# Patient Record
Sex: FEMALE | Race: WHITE | URBAN - METROPOLITAN AREA
[De-identification: names, ages, dates, MRNs, and addresses within clinical notes are randomized per-mention and may not be internally consistent; named-entity substitution may affect disease eponyms.]

---

## 2022-10-18 ENCOUNTER — HOSPITAL ENCOUNTER (OUTPATIENT)
Dept: PHYSICAL THERAPY | Age: 42
Discharge: HOME | End: 2022-10-18
Payer: COMMERCIAL

## 2022-10-18 PROCEDURE — 97535 SELF CARE MNGMENT TRAINING: CPT

## 2022-10-18 PROCEDURE — 97162 PT EVAL MOD COMPLEX 30 MIN: CPT

## 2022-10-18 NOTE — THERAPY EVALUATION
[address], [address],[place], [address] [facility] - Phone: [phone]  Fax: [phone] [facility] / [address] [facility]  Patient Name: Antoine De León : 1980   Medical   Diagnosis: B Knee Pain; R>L Treatment Diagnosis: Left knee pain [M25.562]  Right knee pain [M25.561]   Onset Date: 1 year (Oct 2021)     Referral Source: Marquez Jackson* Start of Care [facility]): 10/18/2022   Prior Hospitalization: See medical history Provider #: [id number]   Prior Level of Function: Ind and pain free with ADLs and activities of leisure   Comorbidities: ADHD. Medications: Verified on Patient Summary List   The Plan of Care and following information is based on the information from the initial evaluation.   ===========================================================================================  Assessment / key information:  Patient is a 43year old female. Primary complaints of bilateral knee pain; R>L that began one year ago due to wear and tear. Locates pain at front of both knees. Rates pain as 2-9/10 from best to worst. Pain today is rated as 3/10. Describes pain as aching normally and occasional sharpness and constant for the right knee and intermittent of the left in nature. Ibuprofen, ice, braces makes the pain better. Squatting down and returning to upright makes the pain worse. Patient is employed as a  at [employer], which requires bending, lifting, standing 9-10 hours, walking. Patient's hobbies have not been affected by her current diagnosis. . Their primary goals in PT are to reduce pain, improve ROM in order to take part in these activities of leisure. Patient recently saw their referring provider who prescribed OPPT.  They are scheduled to return to their provider in 6 weeks (will schedule soon).  ===========================================================================================  Objective Measures:  Observation: Pleasant and cooperative female. Appears stated age. Posture: Good and upright; amb with very slight antalgia and reduce TKE lexy on R  Palpation: crepitus noted bilaterally R>L with PROM  ROM: WNL bilaterally. Fear of R knee flexion at end range  Strength: 5/5 bilateral LE strength grossly  Flexibility: Hamstrings mildly impaired; piriformis mildly impaired Bilaterally. Skin Integrity: Intact  Swelling/Edema: None appreciated  SI Joint Assessment: Not assessed this visit  Positional Tolerances: Patient able to tolerate all positions  FOTO Score: 49/100 Goal ---> 68/100    HEP Provided:  - Discussed icing 3-4x/day when knee is bothering her for 10-15 minute bouts  - Discussed watching how much ibuprofen she is taking to prevent GI distress  - Discussed natural anti-inflammatory options  - Discussed POC and plan for PT progression and goals  - Educated on A&P of arthritis and education on fear reduction    ===========================================================================================  Eval Complexity: History MEDIUM  Complexity : 1-2 comorbidities / personal factors will impact the outcome/ POC ;  Examination  MEDIUM Complexity : 3 Standardized tests and measures addressing body structure, function, activity limitation and / or participation in recreation ; Presentation MEDIUM Complexity : Evolving with changing characteristics ;   Decision Making MEDIUM Complexity : FOTO score of 26-74; Overall Complexity MEDIUM  Problem List: pain affecting function, decrease ROM, decrease strength, impaired gait/ balance, decrease ADL/ functional abilitiies, decrease activity tolerance, decrease flexibility/ joint mobility, and decrease transfer abilities   Treatment Plan may include any combination of the following: Therapeutic exercise, Neuromuscular reeducation, Manual therapy, Therapeutic activity, Self care/home management, Electric stim unattended , Vasopneumatic device, Aquatic therapy, Gait training, Ultrasound, and Electric stim attended  Patient / Family readiness to learn indicated by: asking questions, trying to perform skills, and interest  Persons(s) to be included in education: patient (P)  Barriers to Learning/Limitations: None  Measures taken, if barriers to learning:    Patient Goal (s): Patient would like to return to being able to stand and walk for more time with improved confidence   Patient self reported health status: fair  Rehabilitation Potential: good  Short Term Goals: To be accomplished in  3  weeks:  Patient will show compliance with regular HEP performance as prescribed by PT. Patient will reduce max pain to 6/10 in order to perform iADLs and ADLs more comfortably. Patient will improve hip and glute endurance to be able to perform 50% bridge x 20 reps. Patient will improve GROC score to 2+ for perception of improvement and QoL. Long Term Goals: To be accomplished in  6  weeks:  Patient will reduce max pain to 3/10 in order to perform walking program.  Patient will improve hip and glute endurance to be able to perform 100% bridge x 20 reps. Patient will improve FOTO score to 68 for improved functional mobility and to return to PLOF. Frequency / Duration:   Patient to be seen  2-3  times per week for 6  weeks:  Patient / Caregiver education and instruction: self care, activity modification, and exercises  Therapist Signature: Amber Roberson PT Date: 97/23/2418   Certification Period: NA Time: 6:41 AM   ===========================================================================================  I certify that the above Physical Therapy Services are being furnished while the patient is under my care. I agree with the treatment plan and certify that this therapy is necessary.     Physician Signature:        Date:       Time: Kvng Nicole*  Please sign and return to [facility] [place]. or you may fax the signed copy to [phone]. Thank you.

## 2022-10-18 NOTE — PROGRESS NOTES
PHYSICAL THERAPY - DAILY TREATMENT NOTE    Patient Name: Jan Garcia        Date: 10/18/2022  : 1980   yes Patient  Verified  Visit #:      12  Insurance: Payor: BLUE CROSS / Plan: [address] [place] [address] / Product Type: PPO /      In time: 8:20 am Out time: 9:00 am   Total Treatment Time: 40     Medicare/[facility] Time Tracking (below)   Total Timed Codes (min):  40 1:1 Treatment Time:  40     TREATMENT AREA =  Left knee pain [M25.562]  Right knee pain [M25.561]    SUBJECTIVE  Pain Level (on 0 to 10 scale):  3  / 10   Medication Changes/New allergies or changes in medical history, any new surgeries or procedures?    no  If yes, update Summary List   Subjective Functional Status/Changes:  []  No changes reported     See POC          OBJECTIVE    25 min Self Care: Discussed diagnosis, prognosis, POC/PT Expectations (HEP performance and regular attendance), HEP/exercises, ice versus heat. Rationale:    Patient Education to improve the patients ability to to improve patient's ability to perform functional mobility tasks and activities. Billed With/As:   [] TE   [] TA   [] Neuro   [] Self Care Patient Education: [x] Review HEP    [] Progressed/Changed HEP based on:   [] positioning   [] body mechanics   [] transfers   [] heat/ice application    [] other:      Other Objective/Functional Measures:    See POC     Post Treatment Pain Level (on 0 to 10) scale:   3  / 10     ASSESSMENT  Assessment/Changes in Function:     See POC. []  See Progress Note/Recertification   Patient will continue to benefit from skilled PT services to modify and progress therapeutic interventions, address functional mobility deficits, address ROM deficits, address strength deficits, analyze and address soft tissue restrictions, analyze and cue movement patterns, analyze and modify body mechanics/ergonomics, and assess and modify postural abnormalities to attain remaining goals.    Progress toward goals / Updated goals:    See POC     PLAN  [x]  Upgrade activities as tolerated yes Continue plan of care   []  Discharge due to :    []  Other:      Therapist: Xavier James, PT    Date: 10/18/2022 Time: 6:41 AM     Future Appointments   Date Time Provider Tyrese Holcomb   10/18/2022  8:20 AM Nirmala Cancino, PT [id number]

## 2022-11-15 NOTE — THERAPY DISCHARGE
[address] [facility] PHYSICAL THERAPY  [address] [place] [address] [address],[place], [address] [facility] - Phone: [phone]  Fax: [phone]    DISCHARGE NOTE  Patient Name: Eliu Lopez : 1980   Treatment/Medical Diagnosis: Left knee pain [M25.562]  Right knee pain [M25.561]   Referral Source: Mesfin Van*     Date of Initial Visit: 10/18/2022 Attended Visits: 1 Missed Visits: 2       SUMMARY OF TREATMENT  Pt attended only initial evaluation and     0     follow-ups and then did not return. She no showed to all follow up appointments scheduled. Therefore a formal reassessment of goals was not performed. RECOMMENDATIONS  Discontinue physical therapy due to patient not returning. If you have any questions/comments please contact us directly at [phone]. Thank you for allowing us to assist in the care of your patient.     Therapist Signature: Justice Gavin PT Date: 11/15/2022     Time: 1:12 PM